# Patient Record
Sex: FEMALE | Race: WHITE | NOT HISPANIC OR LATINO | Employment: FULL TIME | URBAN - METROPOLITAN AREA
[De-identification: names, ages, dates, MRNs, and addresses within clinical notes are randomized per-mention and may not be internally consistent; named-entity substitution may affect disease eponyms.]

---

## 2024-02-22 ENCOUNTER — HOSPITAL ENCOUNTER (OUTPATIENT)
Dept: RADIOLOGY | Facility: CLINIC | Age: 34
Discharge: HOME | End: 2024-02-22
Payer: COMMERCIAL

## 2024-02-22 DIAGNOSIS — Z36.82 NUCHAL TRANSLUCENCY OF FETUS ON PRENATAL ULTRASOUND (HHS-HCC): ICD-10-CM

## 2024-02-22 DIAGNOSIS — O30.049 TWIN PREGNANCY, DICHORIONIC/DIAMNIOTIC, UNSPECIFIED TRIMESTER (HHS-HCC): ICD-10-CM

## 2024-02-22 PROCEDURE — 76814 OB US NUCHAL MEAS ADD-ON: CPT | Performed by: OBSTETRICS & GYNECOLOGY

## 2024-02-22 PROCEDURE — 76813 OB US NUCHAL MEAS 1 GEST: CPT | Performed by: OBSTETRICS & GYNECOLOGY

## 2024-02-22 PROCEDURE — 76813 OB US NUCHAL MEAS 1 GEST: CPT

## 2024-03-06 ENCOUNTER — INITIAL PRENATAL (OUTPATIENT)
Dept: MATERNAL FETAL MEDICINE | Facility: CLINIC | Age: 34
End: 2024-03-06
Payer: COMMERCIAL

## 2024-03-06 VITALS
BODY MASS INDEX: 51.34 KG/M2 | HEIGHT: 62 IN | DIASTOLIC BLOOD PRESSURE: 78 MMHG | SYSTOLIC BLOOD PRESSURE: 133 MMHG | WEIGHT: 279 LBS

## 2024-03-06 DIAGNOSIS — O99.891 BACK PAIN IN PREGNANCY (HHS-HCC): Primary | ICD-10-CM

## 2024-03-06 DIAGNOSIS — O99.342 DEPRESSION AFFECTING PREGNANCY IN SECOND TRIMESTER, ANTEPARTUM (HHS-HCC): ICD-10-CM

## 2024-03-06 DIAGNOSIS — M54.9 BACK PAIN IN PREGNANCY (HHS-HCC): Primary | ICD-10-CM

## 2024-03-06 DIAGNOSIS — Z98.84 HISTORY OF ROUX-EN-Y GASTRIC BYPASS: ICD-10-CM

## 2024-03-06 DIAGNOSIS — F32.A DEPRESSION AFFECTING PREGNANCY IN SECOND TRIMESTER, ANTEPARTUM (HHS-HCC): ICD-10-CM

## 2024-03-06 DIAGNOSIS — G35 MULTIPLE SCLEROSIS (MULTI): ICD-10-CM

## 2024-03-06 DIAGNOSIS — O30.042 DICHORIONIC DIAMNIOTIC TWIN PREGNANCY IN SECOND TRIMESTER (HHS-HCC): ICD-10-CM

## 2024-03-06 DIAGNOSIS — Z3A.15 15 WEEKS GESTATION OF PREGNANCY (HHS-HCC): ICD-10-CM

## 2024-03-06 LAB
ALBUMIN SERPL BCP-MCNC: 4.2 G/DL (ref 3.4–5)
ALP SERPL-CCNC: 71 U/L (ref 33–110)
ALT SERPL W P-5'-P-CCNC: 9 U/L (ref 7–45)
ANION GAP SERPL CALC-SCNC: 14 MMOL/L (ref 10–20)
AST SERPL W P-5'-P-CCNC: 23 U/L (ref 9–39)
BILIRUB SERPL-MCNC: 0.4 MG/DL (ref 0–1.2)
BUN SERPL-MCNC: 13 MG/DL (ref 6–23)
CALCIUM SERPL-MCNC: 9.6 MG/DL (ref 8.6–10.6)
CHLORIDE SERPL-SCNC: 104 MMOL/L (ref 98–107)
CO2 SERPL-SCNC: 25 MMOL/L (ref 21–32)
CREAT SERPL-MCNC: 0.49 MG/DL (ref 0.5–1.05)
EGFRCR SERPLBLD CKD-EPI 2021: >90 ML/MIN/1.73M*2
ERYTHROCYTE [DISTWIDTH] IN BLOOD BY AUTOMATED COUNT: 14.7 % (ref 11.5–14.5)
FOLATE SERPL-MCNC: >24 NG/ML
GLUCOSE SERPL-MCNC: 85 MG/DL (ref 74–99)
HCT VFR BLD AUTO: 35.1 % (ref 36–46)
HGB BLD-MCNC: 11.3 G/DL (ref 12–16)
MCH RBC QN AUTO: 30.7 PG (ref 26–34)
MCHC RBC AUTO-ENTMCNC: 32.2 G/DL (ref 32–36)
MCV RBC AUTO: 95 FL (ref 80–100)
NRBC BLD-RTO: 1.4 /100 WBCS (ref 0–0)
PLATELET # BLD AUTO: 315 X10*3/UL (ref 150–450)
POTASSIUM SERPL-SCNC: 4 MMOL/L (ref 3.5–5.3)
PROT SERPL-MCNC: 6.8 G/DL (ref 6.4–8.2)
RBC # BLD AUTO: 3.68 X10*6/UL (ref 4–5.2)
SODIUM SERPL-SCNC: 139 MMOL/L (ref 136–145)
VIT B12 SERPL-MCNC: >2000 PG/ML
WBC # BLD AUTO: 8.6 X10*3/UL (ref 4.4–11.3)

## 2024-03-06 PROCEDURE — 80053 COMPREHEN METABOLIC PANEL: CPT | Performed by: STUDENT IN AN ORGANIZED HEALTH CARE EDUCATION/TRAINING PROGRAM

## 2024-03-06 PROCEDURE — 99205 OFFICE O/P NEW HI 60 MIN: CPT | Performed by: STUDENT IN AN ORGANIZED HEALTH CARE EDUCATION/TRAINING PROGRAM

## 2024-03-06 PROCEDURE — 82607 VITAMIN B-12: CPT | Performed by: STUDENT IN AN ORGANIZED HEALTH CARE EDUCATION/TRAINING PROGRAM

## 2024-03-06 PROCEDURE — 99215 OFFICE O/P EST HI 40 MIN: CPT | Performed by: STUDENT IN AN ORGANIZED HEALTH CARE EDUCATION/TRAINING PROGRAM

## 2024-03-06 PROCEDURE — 36415 COLL VENOUS BLD VENIPUNCTURE: CPT | Performed by: STUDENT IN AN ORGANIZED HEALTH CARE EDUCATION/TRAINING PROGRAM

## 2024-03-06 PROCEDURE — 85027 COMPLETE CBC AUTOMATED: CPT | Performed by: STUDENT IN AN ORGANIZED HEALTH CARE EDUCATION/TRAINING PROGRAM

## 2024-03-06 PROCEDURE — 82746 ASSAY OF FOLIC ACID SERUM: CPT | Performed by: STUDENT IN AN ORGANIZED HEALTH CARE EDUCATION/TRAINING PROGRAM

## 2024-03-06 RX ORDER — AZELAIC ACID 0.15 G/G
GEL TOPICAL
COMMUNITY

## 2024-03-06 RX ORDER — SERTRALINE HYDROCHLORIDE 100 MG/1
TABLET, FILM COATED ORAL
COMMUNITY

## 2024-03-06 RX ORDER — FERROUS SULFATE 325(65) MG
1 TABLET ORAL DAILY
COMMUNITY

## 2024-03-06 RX ORDER — HYDROXYZINE HYDROCHLORIDE 25 MG/1
TABLET, FILM COATED ORAL
COMMUNITY
Start: 2022-01-12

## 2024-03-06 RX ORDER — CYCLOBENZAPRINE HCL 5 MG
5 TABLET ORAL 3 TIMES DAILY PRN
Qty: 30 TABLET | Refills: 0 | Status: SHIPPED | OUTPATIENT
Start: 2024-03-06 | End: 2024-03-16

## 2024-03-06 RX ORDER — ACETAMINOPHEN 500 MG
TABLET ORAL
COMMUNITY

## 2024-03-06 RX ORDER — MONTELUKAST SODIUM 10 MG/1
1 TABLET ORAL DAILY
COMMUNITY
Start: 2012-02-07

## 2024-03-06 RX ORDER — VITAMIN B COMPLEX
TABLET ORAL
COMMUNITY

## 2024-03-06 RX ORDER — ASPIRIN 81 MG/1
81 TABLET ORAL DAILY
COMMUNITY

## 2024-03-06 NOTE — PROGRESS NOTES
"Amira Mott is a 34 yo  @ 15w2d with a dichorionic-diamniotic twin pregnancy who presents for a consultation for a history of multiple sclerosis and bariatric surgery.    Amira was diagnosed with multiple sclerosis in 2019. She currently takes Tysabri. Her last year flair was over a year ago.    Amira underwent a laporoscopic Deann-En-Y gastric bypass in 2013. She reports she has a lost a total of 40-50 lbs. She curently takes vitamin D3, vitamin B12, iron and a prenatal vitamin. She has had no further abdominal surgery.    Amira is otherwise doing well. She reports lower back pain. She denies vaginal bleeding or leakage of fluid.    PMHX: anxiety, depression  PSHX: L/s Deann-En Y  OBHX:    1st: TSVD, complicated by preeclampsia, developed MS in the postpartum period   2nd: current  GYN: denies STIs  MEDS: PNV, Tysabri, vitamin D3, vitamin B12, iron, Zoloft 100 mg daily, hydroxyzine PRN  ALL: Hyoscyamine - hives  SOCIAL: denies tobacco/alcohol/illicit drug use  FAMILY: mother - HTN, maternal grandmother - HTN    O: /78   Ht 1.575 m (5' 2\")   Wt 127 kg (279 lb)   LMP 2023 (Approximate)   BMI 51.03 kg/m²   Gen: NAD  Resp: nonlabored breathing  Cardiac: good peripheral perfusion  Abd: gravid  Psych: appropriate mood and affect  FHTs: +FCA on BSUS x2    A/P: Amira Mott is a 34 yo  @ 15w2d with a dichorionic-diamniotic twin pregnancy who presents for a consultation for a history of multiple sclerosis and bariatric surgery.    Dichorionic-diamniotic twin pregnancy:    Chorionicity/amnionicity and its implications for pregnancy were reviewed in detail. Women with twin pregnancies are increased risk of many pregnancy complications including  birth, preeclampsia, gestational diabetes, stillbirth and  delivery.    We discussed the importance of nutrition and appropriate gestational weight gain in pregnancy. An additional 1mg folic acid supplement should be considered and " the [place] of [employer] recommends total pregnancy weight gain of 25-42 lbs in obese women with twin gestations.     We recommend obtaining a baseline preeclampsia labs and urine p/c ratio in pregnancy. We discussed that aspirin use in patients' with a history of bariatric surgery is often deferred due to the risk of a marginal ulceration. Amira reports she has tolerated NSAIDs in the past. I discussed that if she takes low-dose aspirin for preeclampsia prevention I would recommend she take the lower dose of 81 mg daily. She understands if she has any abdominal pain she should present to OB Triage.    We discussed the need for close surveillance of pregnancy, including a targeted anatomy ultrasound at 20 weeks and serial growth ultrasounds (every 4 weeks) starting at 24 weeks. We also discussed delivery timing. The average gestational age at delivery of twins is 35-36 weeks. Unfortunately, we do not have any preventive measures available in twins to reduce the risk of  birth. Amira should monitor for signs and symptoms of  labor and present early with any concerns. Given the increase in  morbidity and mortality in twin pregnancies carried beyond 38 weeks, we recommend delivery at 38 weeks if the pregnancy continues that far into gestation. Route of delivery was discussed briefly. Amira understands that vaginal delivery can often be considered if the presenting twin is cephalic.     Multiple Sclerosis:    Multiple Sclerosis is an immune mediated demyelinating disease of the central nervous system. It has a variable presentation and clinical course. Symptoms can include sensory disturbances, vision loss, muscle weakness, vertigo, ataxia, and rarely transverse myelitis. The disability associated with multiple sclerosis is dependent on the number and frequency of attacks. Fortunately, Amira has relapsing remitting multiple sclerosis. Her main symptoms are left ankle stiffness and weakness.      Several epidemiology studies have suggested a tendency for remission of multiple sclerosis during pregnancy and an increased frequency of exacerbations in the first 3-6 months postpartum. Data doesnot suggest that pregnancy itself alters the course of multiple sclerosis or the subsequent degree of neurological disability. Similarly, multiple sclerosis is not thought to significantly influence the natural course of pregnancy or childbirth.    Several medications are used to control the frequency of attacks, the duration of attacks, and the disabling symptoms. Amira currently takes Tysibra. We discussed the safety profile of this medication and the risks and benefits of their continuation. We reviewed the risk of  thrombocytopenia and anemia following intrauterine exposure to Tysibra in the third trimester. The [place] consensus on pregnancy in multiple sclerosis recommends discontinuation of Trysriba at 34 weeks gestation. Decisions regarding medication changes should be made in conjunction with Amira's neurologist.    Bariatric Surgery:    We discussed the pregnancy implications of patients with a history of gastric bypass. Specifically, we reviewed the importance of continuing nutritional supplementation throughout the pregnancy. We discussed that with increased nutritional demands, additional supplementation is required, therefore, regular assessment of nutritional status should occur each trimester. Although the ideal weight gain in pregnancy in patients post bariatric surgery is not clear, we recommend the same goals for gestational weight gain based on BMI as patients without a history gastric bypass. I discussed our recommendation for serial growth ultrasounds due to the association between fetal growth restriction and bariatric surgery. Finally, we discussed that long term surgical complications can arise in pregnancy (bowel obstruction, herniation etc) and there should be a low threshold  to consider evaluation if symptoms develop (sudden abdominal pain, nausea, vomiting, obstipation etc.)    My recommendations are as follows:    1. Laboratory assessment of nutritional status every trimester which should include:    --complete blood count  --ferritin  --iron  --vitamin B12  --folate  --calcium  --vitamin D  (I have taken the liberty of ordering these labs today)  2. Targeted anatomic survey  3. Serial growth ultrasound every 4 weeks starting at 28 weeks gestation  4.  should be reserved for regular obstetric indications  5. Avoid NSAIDs in the postoperative period  6. Delivery at 38 weeks unless other complications arise    Depression:    We discussed the management of depression and anxiety during pregnancy, with an emphasis on optimizing maternal well-being as untreated mood disorders can increase the risk of low birth weight, premature delivery and postpartum depression. We discussed the weak association between SSRIs and birth defects in the first trimester (primarily the use of paroxetine). Third trimester use may have implications. We reviewed the risk of  abstinence syndrome, but emphasized that this syndrome can be easily managed by the Pediatricians, and no long term effects have been demonstrated. We also discussed the potential association with persistent pulmonary hypertension of the  (PPHN). There may be a small increase in the risk of PPHN among those who use SSRIs in late pregnancy, however about 99% of women exposed to one of these medications in late pregnancy will deliver an infant unaffected by PPHN. We discussed that the absolute risks of continuing SSRIs in pregnancy are small but present and need to be weighed against the potential maternal benefits. Availability of alternative treatment like psychotherapy should also be taken into consideration.     Depression and anxiety symptoms can worsen postpartum, and Amira should have a support system in place to  address any symptoms that might arise in that time. I recommend scheduling an appointment with a mental health provider in the postpartum period and that she seek medical attention if she feels that her symptoms are escalating in the postpartum period.    Thank you for the consultation. Please reach out to me with any questions or concerns. If you would prefer, we are happy to have Amira transfer to our services.    John Carpio MD  Maternal-Fetal Medicine

## 2024-03-07 LAB — REFLEX ADDED, ANEMIA PANEL: NORMAL

## 2024-04-08 ENCOUNTER — HOSPITAL ENCOUNTER (OUTPATIENT)
Dept: RADIOLOGY | Facility: CLINIC | Age: 34
Discharge: HOME | End: 2024-04-08
Payer: COMMERCIAL

## 2024-04-08 DIAGNOSIS — Z36.82 NUCHAL TRANSLUCENCY OF FETUS ON PRENATAL ULTRASOUND (HHS-HCC): ICD-10-CM

## 2024-04-08 PROCEDURE — 76811 OB US DETAILED SNGL FETUS: CPT | Performed by: OBSTETRICS & GYNECOLOGY

## 2024-04-08 PROCEDURE — 76811 OB US DETAILED SNGL FETUS: CPT

## 2024-04-08 PROCEDURE — 76812 OB US DETAILED ADDL FETUS: CPT | Performed by: OBSTETRICS & GYNECOLOGY

## 2024-05-06 NOTE — PROGRESS NOTES
Subjective   Patient ID [id number]   Amira Mott is a 33 y.o.  at 24w1d.  She denies leakage of fluid, decreased fetal movements, or contractions. We are following in consultation for pregnancy complicated by dichorionic twin gestation, maternal BMI > 50 and multiple sclerosis. She had no complaints at the time of her visit today. An ultrasound prior to our consultation was reassuring. She reports rare spotting and lower back pain, similar to her prior pregnancy, for which PT consultation is planned. I reviewed conservative measures.     Objective   Visit Vitals  /81 (BP Location: Right arm, Patient Position: Sitting, BP Cuff Size: Adult)   Pulse 85      Physical Exam  Weight: 132 kg (290 lb)  BP: 138/81  Fetal Heart Rate: US      Prenatal Labs  Lab Results   Component Value Date    HGB 11.3 (L) 2024    HCT 35.1 (L) 2024     Imaging  Dichorionic twin gestation AGA and concordant.     Assessment/Plan     The following was reviewed:    Dichorionic twin gestation: Follow up ultrasound for biometry in 4 weeks.     Multiple sclerosis: She remains flare and symptom free. She recently had a Trysiba treatment which is currently planned as her last given stability and later fetal concerns, though I reviewed that in the event of a flare we would be comfortable with use until 34 weeks. She plans to resume post partum and is aware of the risks of PP flares. Her flares are triggered by sleep depravation and stress though she has a robust support network including her partner and nearby family and friends.     BMI > 50, former bariatric procedure: Labs from last consultation reassuring. Will be for later GDM screening, likely blood sugar monitoring given Deann-en-Y.     Depression, history of pre-eclampsia: Stable. Aspirin 81 given bariatric surgery. Normotensive today.     We are following in consultation with Dr. Sanchez. Follow up ultrasounds at 28 and 32 weeks and we will see for follow up consultation  at 32 week US or sooner as clinically indicated.     Zeferino Anand MD   Maternal Fetal Medicine

## 2024-05-07 ENCOUNTER — OFFICE VISIT (OUTPATIENT)
Dept: MATERNAL FETAL MEDICINE | Facility: CLINIC | Age: 34
End: 2024-05-07
Payer: COMMERCIAL

## 2024-05-07 ENCOUNTER — HOSPITAL ENCOUNTER (OUTPATIENT)
Dept: RADIOLOGY | Facility: CLINIC | Age: 34
Discharge: HOME | End: 2024-05-07
Payer: COMMERCIAL

## 2024-05-07 VITALS
HEART RATE: 85 BPM | DIASTOLIC BLOOD PRESSURE: 81 MMHG | WEIGHT: 290 LBS | SYSTOLIC BLOOD PRESSURE: 138 MMHG | BODY MASS INDEX: 53.37 KG/M2 | HEIGHT: 62 IN

## 2024-05-07 DIAGNOSIS — Z36.82 NUCHAL TRANSLUCENCY OF FETUS ON PRENATAL ULTRASOUND (HHS-HCC): ICD-10-CM

## 2024-05-07 DIAGNOSIS — Z98.84 HISTORY OF ROUX-EN-Y GASTRIC BYPASS: ICD-10-CM

## 2024-05-07 DIAGNOSIS — O30.042 DICHORIONIC DIAMNIOTIC TWIN PREGNANCY IN SECOND TRIMESTER (HHS-HCC): Primary | ICD-10-CM

## 2024-05-07 DIAGNOSIS — G35 MULTIPLE SCLEROSIS (MULTI): ICD-10-CM

## 2024-05-07 DIAGNOSIS — O30.042 DICHORIONIC DIAMNIOTIC TWIN PREGNANCY IN SECOND TRIMESTER (HHS-HCC): ICD-10-CM

## 2024-05-07 PROCEDURE — 76816 OB US FOLLOW-UP PER FETUS: CPT | Performed by: OBSTETRICS & GYNECOLOGY

## 2024-05-07 PROCEDURE — 76816 OB US FOLLOW-UP PER FETUS: CPT

## 2024-05-07 PROCEDURE — 1036F TOBACCO NON-USER: CPT | Performed by: OBSTETRICS & GYNECOLOGY

## 2024-05-07 PROCEDURE — 0501F PRENATAL FLOW SHEET: CPT | Performed by: OBSTETRICS & GYNECOLOGY

## 2024-05-07 ASSESSMENT — PAIN SCALES - GENERAL: PAINLEVEL: 6

## 2024-06-04 ENCOUNTER — HOSPITAL ENCOUNTER (OUTPATIENT)
Dept: RADIOLOGY | Facility: CLINIC | Age: 34
Discharge: HOME | End: 2024-06-04
Payer: COMMERCIAL

## 2024-06-04 DIAGNOSIS — O30.043 DICHORIONIC DIAMNIOTIC TWIN PREGNANCY IN THIRD TRIMESTER (HHS-HCC): ICD-10-CM

## 2024-06-04 DIAGNOSIS — Z36.82 NUCHAL TRANSLUCENCY OF FETUS ON PRENATAL ULTRASOUND (HHS-HCC): ICD-10-CM

## 2024-06-04 PROCEDURE — 76816 OB US FOLLOW-UP PER FETUS: CPT | Performed by: OBSTETRICS & GYNECOLOGY

## 2024-06-04 PROCEDURE — 76816 OB US FOLLOW-UP PER FETUS: CPT

## 2024-06-04 PROCEDURE — 76819 FETAL BIOPHYS PROFIL W/O NST: CPT

## 2024-06-04 PROCEDURE — 76819 FETAL BIOPHYS PROFIL W/O NST: CPT | Performed by: OBSTETRICS & GYNECOLOGY

## 2024-07-09 ENCOUNTER — ROUTINE PRENATAL (OUTPATIENT)
Dept: MATERNAL FETAL MEDICINE | Facility: CLINIC | Age: 34
End: 2024-07-09
Payer: COMMERCIAL

## 2024-07-09 ENCOUNTER — HOSPITAL ENCOUNTER (OUTPATIENT)
Dept: RADIOLOGY | Facility: CLINIC | Age: 34
Discharge: HOME | End: 2024-07-09
Payer: COMMERCIAL

## 2024-07-09 VITALS — BODY MASS INDEX: 56.88 KG/M2 | SYSTOLIC BLOOD PRESSURE: 148 MMHG | WEIGHT: 293 LBS | DIASTOLIC BLOOD PRESSURE: 85 MMHG

## 2024-07-09 DIAGNOSIS — O14.03 MILD PRE-ECLAMPSIA IN THIRD TRIMESTER (HHS-HCC): Primary | ICD-10-CM

## 2024-07-09 DIAGNOSIS — G35 MULTIPLE SCLEROSIS (MULTI): ICD-10-CM

## 2024-07-09 DIAGNOSIS — Z98.84 HISTORY OF ROUX-EN-Y GASTRIC BYPASS: ICD-10-CM

## 2024-07-09 DIAGNOSIS — Z36.82 NUCHAL TRANSLUCENCY OF FETUS ON PRENATAL ULTRASOUND (HHS-HCC): ICD-10-CM

## 2024-07-09 DIAGNOSIS — O30.043 DICHORIONIC DIAMNIOTIC TWIN PREGNANCY IN THIRD TRIMESTER (HHS-HCC): ICD-10-CM

## 2024-07-09 DIAGNOSIS — O99.210 OBESITY AFFECTING PREGNANCY (HHS-HCC): ICD-10-CM

## 2024-07-09 DIAGNOSIS — O30.049 DICHORIONIC DIAMNIOTIC TWIN GESTATION (HHS-HCC): ICD-10-CM

## 2024-07-09 DIAGNOSIS — Z3A.33 33 WEEKS GESTATION OF PREGNANCY (HHS-HCC): ICD-10-CM

## 2024-07-09 PROCEDURE — 76819 FETAL BIOPHYS PROFIL W/O NST: CPT

## 2024-07-09 PROCEDURE — 76820 UMBILICAL ARTERY ECHO: CPT | Performed by: STUDENT IN AN ORGANIZED HEALTH CARE EDUCATION/TRAINING PROGRAM

## 2024-07-09 PROCEDURE — 76816 OB US FOLLOW-UP PER FETUS: CPT | Performed by: STUDENT IN AN ORGANIZED HEALTH CARE EDUCATION/TRAINING PROGRAM

## 2024-07-09 PROCEDURE — 99215 OFFICE O/P EST HI 40 MIN: CPT | Mod: 25 | Performed by: STUDENT IN AN ORGANIZED HEALTH CARE EDUCATION/TRAINING PROGRAM

## 2024-07-09 PROCEDURE — 76816 OB US FOLLOW-UP PER FETUS: CPT

## 2024-07-09 PROCEDURE — 99215 OFFICE O/P EST HI 40 MIN: CPT | Performed by: STUDENT IN AN ORGANIZED HEALTH CARE EDUCATION/TRAINING PROGRAM

## 2024-07-09 NOTE — PROGRESS NOTES
S: Amira Mott is a 34 yo  @ 33w1d with dichorionic-diamniotic twins who presents for a [facility] return consultation/[facility]. Pregnancy complicated by MS, depression and hx of a Deann-En-Y gastric bypass. Please see my consultation note from 3/6/24 for additional details.    Amira reports she was recently diagnosed with elevated blood pressures. On chart review her P:C is 0.34, consistent with preeclampsia without severe features. She denies headaches, visual changes or RUQ pain. She denies vaginal bleeding or leakage of fluid. She reports good fetal movement. No other concerns.    O: /85   Wt 141 kg (311 lb)   LMP 2023 (Approximate)   BMI 56.88 kg/m²   Gen: NAD  Resp: nonlabored breathing  Cardiac: good peripheral perfusion  Abd: gravid  Psych: appropriate mood and affect  FHTs: +FCA on U/S    A/P: Amira Mott is a 34 yo  @ 33w1d with dichorionic-diamniotic twins who presents for a M return consultation/KEM. Pregnancy complicated by MS, depression and hx of a Deann-En-Y gastric bypass.     *Preeclampsia without severe features  - we discussed the diagnosis of PreE without SF. I discussed that PreE is a disease that can lead to seizures, stroke, MI, end-organ damage as well as placental abruption and stillbirth  - recommend weekly PreE labs, 2x weekly  testing (alternating BPP and NST), weekly MD visits and a growth in 3 weeks  - Amira should start checking her Bps 1-2x daily and call or got to OB Triage with Bps >=160/110 or if she begins having headaches, visual changes or RUQ pain  - delivery is recommended at 37w0d    *Dichorionic-diamniotic twin pregnancy  - previously discussed the risk of  birth, preeclampsia, gestational diabetes, stillbirth and need for  delivery  - growth today: Twin A EFW 1908 g breech (16%); Twin B breech, EFW 1830 g (11%)  - pt planning for a primary  due to fetal malpresentation  - recommend a growth in one week. If either fetus is  growth restricted I would recommend delivery at 36 wks due to the known diagnosis of PreE without SF    *Multiple Sclerosis   - pt is doing well this pregnancy  - plans to resume Tysibra pp. I recommended she establish care with a Neurologist in [place]    *Depression  - continue prescribed Zoloft  - previously counseled regarding risk of 3rd trimester use    *Deann-En-Y  - doing well  - passed 1 hour glucola  - continue vitamin supplementation    Dispo - Growth ultrasound in 3 weeks    John Carpio MD  Maternal-Fetal Medicine

## 2024-07-30 ENCOUNTER — HOSPITAL ENCOUNTER (OUTPATIENT)
Dept: RADIOLOGY | Facility: CLINIC | Age: 34
Discharge: HOME | End: 2024-07-30
Payer: COMMERCIAL

## 2024-07-30 DIAGNOSIS — O30.049: ICD-10-CM

## 2024-07-30 DIAGNOSIS — O30.049 DICHORIONIC DIAMNIOTIC TWIN PREGNANCY, ANTEPARTUM (HHS-HCC): ICD-10-CM

## 2024-07-30 DIAGNOSIS — Z36.82 NUCHAL TRANSLUCENCY OF FETUS ON PRENATAL ULTRASOUND (HHS-HCC): ICD-10-CM

## 2024-07-30 PROCEDURE — 76819 FETAL BIOPHYS PROFIL W/O NST: CPT | Performed by: STUDENT IN AN ORGANIZED HEALTH CARE EDUCATION/TRAINING PROGRAM

## 2024-07-30 PROCEDURE — 76820 UMBILICAL ARTERY ECHO: CPT | Performed by: STUDENT IN AN ORGANIZED HEALTH CARE EDUCATION/TRAINING PROGRAM

## 2024-07-30 PROCEDURE — 76816 OB US FOLLOW-UP PER FETUS: CPT | Performed by: STUDENT IN AN ORGANIZED HEALTH CARE EDUCATION/TRAINING PROGRAM

## 2024-07-30 PROCEDURE — 76816 OB US FOLLOW-UP PER FETUS: CPT

## 2024-07-30 PROCEDURE — 76820 UMBILICAL ARTERY ECHO: CPT

## 2024-07-30 PROCEDURE — 76819 FETAL BIOPHYS PROFIL W/O NST: CPT

## 2024-10-11 ENCOUNTER — HOSPITAL ENCOUNTER (EMERGENCY)
Facility: HOSPITAL | Age: 34
Discharge: HOME | End: 2024-10-12
Attending: EMERGENCY MEDICINE
Payer: COMMERCIAL

## 2024-10-11 VITALS
HEIGHT: 62 IN | SYSTOLIC BLOOD PRESSURE: 152 MMHG | RESPIRATION RATE: 18 BRPM | TEMPERATURE: 96.8 F | HEART RATE: 57 BPM | WEIGHT: 280 LBS | OXYGEN SATURATION: 95 % | DIASTOLIC BLOOD PRESSURE: 95 MMHG | BODY MASS INDEX: 51.53 KG/M2

## 2024-10-11 DIAGNOSIS — R11.0 NAUSEA: Primary | ICD-10-CM

## 2024-10-11 DIAGNOSIS — F41.9 ANXIETY: ICD-10-CM

## 2024-10-11 DIAGNOSIS — F41.0 PANIC ATTACK: ICD-10-CM

## 2024-10-11 LAB
ANION GAP BLDV CALCULATED.4IONS-SCNC: 16 MMOL/L (ref 10–25)
BASE EXCESS BLDV CALC-SCNC: -6.4 MMOL/L (ref -2–3)
BASOPHILS # BLD AUTO: 0.07 X10*3/UL (ref 0–0.1)
BASOPHILS NFR BLD AUTO: 0.8 %
BODY TEMPERATURE: 37 DEGREES CELSIUS
CA-I BLDV-SCNC: 1.07 MMOL/L (ref 1.1–1.33)
CHLORIDE BLDV-SCNC: 108 MMOL/L (ref 98–107)
CRITICAL CALL TIME: 2344
CRITICAL CALLED BY: ABNORMAL
CRITICAL CALLED TO: ABNORMAL
CRITICAL READ BACK: ABNORMAL
EOSINOPHIL # BLD AUTO: 0.22 X10*3/UL (ref 0–0.7)
EOSINOPHIL NFR BLD AUTO: 2.6 %
ERYTHROCYTE [DISTWIDTH] IN BLOOD BY AUTOMATED COUNT: 14.8 % (ref 11.5–14.5)
GLUCOSE BLDV-MCNC: 111 MG/DL (ref 74–99)
HCO3 BLDV-SCNC: 19.1 MMOL/L (ref 22–26)
HCT VFR BLD AUTO: 39.7 % (ref 36–46)
HCT VFR BLD EST: 35 % (ref 36–46)
HGB BLD-MCNC: 12 G/DL (ref 12–16)
HGB BLDV-MCNC: 11.7 G/DL (ref 12–16)
IMM GRANULOCYTES # BLD AUTO: 0.03 X10*3/UL (ref 0–0.7)
IMM GRANULOCYTES NFR BLD AUTO: 0.4 % (ref 0–0.9)
INHALED O2 CONCENTRATION: 21 %
LACTATE BLDV-SCNC: 4.9 MMOL/L (ref 0.4–2)
LYMPHOCYTES # BLD AUTO: 1.64 X10*3/UL (ref 1.2–4.8)
LYMPHOCYTES NFR BLD AUTO: 19.7 %
MCH RBC QN AUTO: 28.8 PG (ref 26–34)
MCHC RBC AUTO-ENTMCNC: 30.2 G/DL (ref 32–36)
MCV RBC AUTO: 95 FL (ref 80–100)
MONOCYTES # BLD AUTO: 0.52 X10*3/UL (ref 0.1–1)
MONOCYTES NFR BLD AUTO: 6.2 %
NEUTROPHILS # BLD AUTO: 5.85 X10*3/UL (ref 1.2–7.7)
NEUTROPHILS NFR BLD AUTO: 70.3 %
NRBC BLD-RTO: 0 /100 WBCS (ref 0–0)
OXYHGB MFR BLDV: 56.6 % (ref 45–75)
PCO2 BLDV: 37 MM HG (ref 41–51)
PH BLDV: 7.32 PH (ref 7.33–7.43)
PLATELET # BLD AUTO: 318 X10*3/UL (ref 150–450)
PO2 BLDV: 39 MM HG (ref 35–45)
POTASSIUM BLDV-SCNC: 4.5 MMOL/L (ref 3.5–5.3)
RBC # BLD AUTO: 4.17 X10*6/UL (ref 4–5.2)
SAO2 % BLDV: 58 % (ref 45–75)
SODIUM BLDV-SCNC: 139 MMOL/L (ref 136–145)
WBC # BLD AUTO: 8.3 X10*3/UL (ref 4.4–11.3)

## 2024-10-11 PROCEDURE — 82435 ASSAY OF BLOOD CHLORIDE: CPT | Performed by: EMERGENCY MEDICINE

## 2024-10-11 PROCEDURE — 96374 THER/PROPH/DIAG INJ IV PUSH: CPT

## 2024-10-11 PROCEDURE — 85025 COMPLETE CBC W/AUTO DIFF WBC: CPT | Performed by: EMERGENCY MEDICINE

## 2024-10-11 PROCEDURE — 99284 EMERGENCY DEPT VISIT MOD MDM: CPT | Mod: 25

## 2024-10-11 PROCEDURE — 85018 HEMOGLOBIN: CPT | Mod: 59 | Performed by: EMERGENCY MEDICINE

## 2024-10-11 PROCEDURE — 96375 TX/PRO/DX INJ NEW DRUG ADDON: CPT

## 2024-10-11 PROCEDURE — 2500000004 HC RX 250 GENERAL PHARMACY W/ HCPCS (ALT 636 FOR OP/ED): Performed by: EMERGENCY MEDICINE

## 2024-10-11 PROCEDURE — 93005 ELECTROCARDIOGRAM TRACING: CPT

## 2024-10-11 PROCEDURE — 36415 COLL VENOUS BLD VENIPUNCTURE: CPT | Performed by: EMERGENCY MEDICINE

## 2024-10-11 RX ORDER — LORAZEPAM 2 MG/ML
1 INJECTION INTRAMUSCULAR ONCE
Status: COMPLETED | OUTPATIENT
Start: 2024-10-11 | End: 2024-10-11

## 2024-10-11 RX ORDER — PROCHLORPERAZINE EDISYLATE 5 MG/ML
10 INJECTION INTRAMUSCULAR; INTRAVENOUS ONCE
Status: COMPLETED | OUTPATIENT
Start: 2024-10-11 | End: 2024-10-11

## 2024-10-11 RX ADMIN — LORAZEPAM 1 MG: 2 INJECTION INTRAMUSCULAR; INTRAVENOUS at 23:08

## 2024-10-11 RX ADMIN — PROCHLORPERAZINE EDISYLATE 10 MG: 5 INJECTION INTRAMUSCULAR; INTRAVENOUS at 23:07

## 2024-10-11 ASSESSMENT — PAIN - FUNCTIONAL ASSESSMENT: PAIN_FUNCTIONAL_ASSESSMENT: 0-10

## 2024-10-11 ASSESSMENT — PAIN SCALES - GENERAL: PAINLEVEL_OUTOF10: 7

## 2024-10-12 ENCOUNTER — APPOINTMENT (OUTPATIENT)
Dept: RADIOLOGY | Facility: HOSPITAL | Age: 34
End: 2024-10-12
Payer: COMMERCIAL

## 2024-10-12 ENCOUNTER — APPOINTMENT (OUTPATIENT)
Dept: CARDIOLOGY | Facility: HOSPITAL | Age: 34
End: 2024-10-12
Payer: COMMERCIAL

## 2024-10-12 LAB
ALBUMIN SERPL BCP-MCNC: 4.5 G/DL (ref 3.4–5)
ALP SERPL-CCNC: 172 U/L (ref 33–110)
ALT SERPL W P-5'-P-CCNC: 6 U/L (ref 7–45)
ANION GAP SERPL CALC-SCNC: 17 MMOL/L (ref 10–20)
AST SERPL W P-5'-P-CCNC: 25 U/L (ref 9–39)
BILIRUB DIRECT SERPL-MCNC: 0 MG/DL (ref 0–0.3)
BILIRUB SERPL-MCNC: 0.4 MG/DL (ref 0–1.2)
BUN SERPL-MCNC: 10 MG/DL (ref 6–23)
CALCIUM SERPL-MCNC: 9.1 MG/DL (ref 8.6–10.3)
CHLORIDE SERPL-SCNC: 104 MMOL/L (ref 98–107)
CO2 SERPL-SCNC: 22 MMOL/L (ref 21–32)
CREAT SERPL-MCNC: 0.85 MG/DL (ref 0.5–1.05)
EGFRCR SERPLBLD CKD-EPI 2021: >90 ML/MIN/1.73M*2
GLUCOSE SERPL-MCNC: 126 MG/DL (ref 74–99)
POTASSIUM SERPL-SCNC: 3.8 MMOL/L (ref 3.5–5.3)
PROT SERPL-MCNC: 7.5 G/DL (ref 6.4–8.2)
SODIUM SERPL-SCNC: 139 MMOL/L (ref 136–145)

## 2024-10-12 PROCEDURE — 71046 X-RAY EXAM CHEST 2 VIEWS: CPT | Performed by: STUDENT IN AN ORGANIZED HEALTH CARE EDUCATION/TRAINING PROGRAM

## 2024-10-12 PROCEDURE — 80053 COMPREHEN METABOLIC PANEL: CPT | Performed by: EMERGENCY MEDICINE

## 2024-10-12 PROCEDURE — 96375 TX/PRO/DX INJ NEW DRUG ADDON: CPT

## 2024-10-12 PROCEDURE — 36415 COLL VENOUS BLD VENIPUNCTURE: CPT | Performed by: EMERGENCY MEDICINE

## 2024-10-12 PROCEDURE — 2500000004 HC RX 250 GENERAL PHARMACY W/ HCPCS (ALT 636 FOR OP/ED): Performed by: EMERGENCY MEDICINE

## 2024-10-12 PROCEDURE — 71046 X-RAY EXAM CHEST 2 VIEWS: CPT

## 2024-10-12 RX ORDER — METOCLOPRAMIDE HYDROCHLORIDE 5 MG/ML
10 INJECTION INTRAMUSCULAR; INTRAVENOUS ONCE
Status: COMPLETED | OUTPATIENT
Start: 2024-10-12 | End: 2024-10-12

## 2024-10-12 RX ORDER — PROCHLORPERAZINE MALEATE 10 MG
10 TABLET ORAL EVERY 6 HOURS PRN
Qty: 20 TABLET | Refills: 0 | Status: SHIPPED | OUTPATIENT
Start: 2024-10-12 | End: 2024-10-17

## 2024-10-12 RX ADMIN — METOCLOPRAMIDE HYDROCHLORIDE 10 MG: 5 INJECTION INTRAMUSCULAR; INTRAVENOUS at 00:54

## 2024-10-12 NOTE — PROGRESS NOTES
Patient with nausea.  Laboratories otherwise unremarkable.  No more vomiting.  Symptoms improved.  Patient wished to be discharged.  She be given a prescription for Compazine 10 mg 4 times a day as needed for nausea.  Follow-up with primary care doctor if symptoms do not improve

## 2024-10-12 NOTE — ED PROVIDER NOTES
HPI   Chief Complaint   Patient presents with   • Nausea   • Anxiety       HPI: []  33-year-old white female history of multiple sclerosis currently on medications remote gastric bypass, anxiety comes in with a panic attack.  She states that she developed nausea no emesis and severe anxiety.  She is thinks she had a panic attack.  Received Zofran in the EMS.  She denies a chest pain pressure heaviness fever chills vomiting diarrhea syncope onus syncope no hematemesis melena hematochezia no hemoptysis no recent travel hospitalization or antibiotic use.  No headache vision changes.    Past history: High BMI, anxiety, multiple sclerosis, remote gastric bypass  Social: Denies current tobacco alcohol drug use.  REVIEW OF SYSTEMS:    GENERAL.: No weight loss, fatigue, anorexia, insomnia, fever.    EYES: No vision loss, double vision, drainage, eye pain.    ENT: No pharyngitis, dry mouth.    CARDIOPULMONARY: No chest pain, palpitations, syncope, near syncope. No shortness of breath, cough, hemoptysis.    GI: No abdominal pain, change in bowel habits, melena, hematemesis, hematochezia, positive nausea, vomiting, diarrhea.    : No discharge, dysuria, frequency, urgency, hematuria.    MS: No limb pain, joint pain, joint swelling.    SKIN: No rashes.    PSYCH: No depression, positive anxiety, suicidality, homicidality.    Review of systems is otherwise negative unless stated above or in history of present illness.  Social history, family history, allergies reviewed.  PHYSICAL EXAM:    GENERAL: Vitals noted, moderate distress. Alert and oriented  x 3. Non-toxic.  Anxious having panic attack    EENT: TMs clear. Posterior oropharynx unremarkable. No meningismus. No LAD.     NECK: Supple. Nontender. No midline tenderness.     CARDIAC: Regular, rate, rhythm. No murmurs rubs or gallops. No JVD    PULMONARY: Lungs clear bilaterally with good aeration. No wheezes rales or rhonchi. No respiratory distress.  No tachypnea stridor or  retractions able to speak in full sentences    ABDOMEN: Soft, nonsurgical. Nontender. No peritoneal signs. Normoactive bowel sounds. No pulsatile masses.  Negative CVA tenderness no inguinal hernias    EXTREMITIES: No peripheral edema. Negative Homans bilaterally, no cords.  Plus bounding pulses well-perfused.    SKIN: No rash. Intact.     NEURO: No focal neurologic deficits, NIH score of 0. Cranial nerves normal as tested from II through XII.     MEDICAL DECISION MAKING:  EKG on my interpretation shows a sinus bradycardia normal axis rate mid 50s QTc 469 ms with no ischemic changes.  CBC with shows no leukocytosis stable hemoglobin venous full panel shows low pH 7.32 lactate 4.9 elevated labs are pending.    Treatments: IV established she was given intravenous Compazine Reglan and lorazepam.  And oral liquids.    ED course: Patient has been feeling better    Impression: #1 anxiety/panic attack, #2 lactic acidosis, #3 nausea  Plan set MDM: 33-year-old female history of multiple sclerosis on medications comes in with a sounds of acute panic attack with nausea, low concern for STEMI NSTEMI infection sepsis pulm embolism or dissection or any other catastrophic pathology.  Patient is improving with diazepam and antiemetics currently awaiting laboratory workup with repeat lactate level will be signed for oncoming colleague pending reevaluation laboratory workup and a final disposition.                  Patient History   No past medical history on file.  Past Surgical History:   Procedure Laterality Date   • GASTRIC BYPASS  07/17/2014    Gastric Surgery For Morbid Obesity Bypass With Deann-en-Y   • GASTRIC BYPASS  09/24/2014    High Gastric Bypass     No family history on file.  Social History     Tobacco Use   • Smoking status: Never   • Smokeless tobacco: Never   Substance Use Topics   • Alcohol use: Not Currently     Comment: Socially   • Drug use: Never       Physical Exam   ED Triage Vitals [10/11/24 2301]    Temperature Heart Rate Respirations BP   36.2 °C (97.2 °F) 68 20 160/84      Pulse Ox Temp Source Heart Rate Source Patient Position   98 % Oral Monitor --      BP Location FiO2 (%)     Right arm --       Physical Exam      ED Course & [facility]   ED Course as of 10/12/24 0120   Sat Oct 12, 2024   0115 Patient CBC vit H shows no leukocytosis, venous full panel shows low pH on 132 lactate 4.9 elevated, chest x-ray negative, EKG shows a sinus bradycardia normal axis rate mid 60s with no ischemic changes QTc interval is about 4 6 9 ms patient received intravenous lorazepam Compazine Reglan we will reassess and make a disposition.  Signout to oncoming colleague [MT]      ED Course User Index  [MT] Asya Moreira MD         Diagnoses as of 10/12/24 0120   Nausea   Panic attack   Anxiety                 No data recorded     Joanna Coma Scale Score: 15 (10/11/24 2302 : Johnny Saucedo, DANI)                           Medical Decision Making      Procedure  Procedures     Asya Moreira MD  10/12/24 0120

## 2024-10-15 LAB
ATRIAL RATE: 53 BPM
P AXIS: -10 DEGREES
P OFFSET: 192 MS
P ONSET: 137 MS
PR INTERVAL: 164 MS
Q ONSET: 219 MS
QRS COUNT: 9 BEATS
QRS DURATION: 86 MS
QT INTERVAL: 468 MS
QTC CALCULATION(BAZETT): 439 MS
QTC FREDERICIA: 449 MS
R AXIS: 53 DEGREES
T AXIS: 54 DEGREES
T OFFSET: 453 MS
VENTRICULAR RATE: 53 BPM